# Patient Record
Sex: FEMALE | Race: WHITE | NOT HISPANIC OR LATINO | Employment: FULL TIME | URBAN - METROPOLITAN AREA
[De-identification: names, ages, dates, MRNs, and addresses within clinical notes are randomized per-mention and may not be internally consistent; named-entity substitution may affect disease eponyms.]

---

## 2020-12-03 ENCOUNTER — TRANSFERRED RECORDS (OUTPATIENT)
Dept: HEALTH INFORMATION MANAGEMENT | Facility: CLINIC | Age: 25
End: 2020-12-03

## 2020-12-03 LAB — PAP-ABSTRACT: NORMAL

## 2022-03-24 ENCOUNTER — TRANSFERRED RECORDS (OUTPATIENT)
Dept: HEALTH INFORMATION MANAGEMENT | Facility: CLINIC | Age: 27
End: 2022-03-24

## 2023-10-25 ENCOUNTER — OFFICE VISIT (OUTPATIENT)
Dept: URGENT CARE | Facility: URGENT CARE | Age: 28
End: 2023-10-25
Payer: COMMERCIAL

## 2023-10-25 VITALS
WEIGHT: 218.6 LBS | DIASTOLIC BLOOD PRESSURE: 104 MMHG | SYSTOLIC BLOOD PRESSURE: 171 MMHG | OXYGEN SATURATION: 96 % | TEMPERATURE: 98.7 F | RESPIRATION RATE: 12 BRPM | HEART RATE: 108 BPM

## 2023-10-25 DIAGNOSIS — H69.92 DYSFUNCTION OF LEFT EUSTACHIAN TUBE: Primary | ICD-10-CM

## 2023-10-25 PROCEDURE — 99203 OFFICE O/P NEW LOW 30 MIN: CPT | Performed by: PHYSICIAN ASSISTANT

## 2023-10-25 NOTE — PROGRESS NOTES
Chief Complaint   Patient presents with    Urgent Care    Otalgia     Left ear pain for couple days, last week had uti and on antibiotic but don't know if have ear infection at the time, but it feels plugged (finished antibiotic for uti on Sunday)       ASSESSMENT/PLAN:  Cortes was seen today for urgent care and otalgia.    Diagnoses and all orders for this visit:    Dysfunction of left eustachian tube    Symptomatic cares and expected length of symptoms discussed at length and outlined in AVS  Return precautions also discussed    Flonase/fluticasone nasal spray 2 sprays in each nostril once a day for 1 - 4 weeks.  This may take several days to become effective.   Consider saline nasal rinses  Ibuprofen 400 mg to 600 mg 4 times a day as needed for pain relief and anti-inflammatory  Otovent can be bought on [employer]    Dimitry Schmidt PA-C      SUBJECTIVE:  Cortes is a 28 year old female who presents to urgent care with left ear pain that started yesterday.  She has had a cold like illness for a week, was seen in the ER last week and treated for UTI.  Did have a CT scan of her head done at that time due to dizziness which was normal.  Overall the symptoms have improved.  No fevers or chills..    ROS: Pertinent ROS neg other than the symptoms noted above in the HPI.     OBJECTIVE:  BP (!) 171/104 (BP Location: Left arm, Patient Position: Sitting, Cuff Size: Adult Large)   Pulse 108   Temp 98.7  F (37.1  C) (Tympanic)   Resp 12   Wt 99.2 kg (218 lb 9.6 oz)   LMP 10/14/2023   SpO2 96%    GENERAL: healthy, alert and no distress  EYES: Eyes grossly normal to inspection, PERRL and conjunctivae and sclerae normal  HENT: ear canals and TM's normal, nose and mouth without ulcers or lesions, no oropharynx erythema, nasal nasal mucosa erythema  Neuro: Cranial nerves 2 through 12 grossly intact, normal gait    DIAGNOSTICS    No results found for any visits on 10/25/23.     No current outpatient medications on file.     No  current facility-administered medications for this visit.      There is no problem list on file for this patient.     No past medical history on file.  No past surgical history on file.  History reviewed. No pertinent family history.  Social History     Tobacco Use    Smoking status: Never    Smokeless tobacco: Never   Substance Use Topics    Alcohol use: Yes              The plan of care was discussed with the patient. They understand and agree with the course of treatment prescribed. A printed summary was given including instructions and medications.  The use of Dragon/QVPN dictation services may have been used to construct the content in this note; any grammatical or spelling errors are non-intentional. Please contact the author of this note directly if you are in need of any clarification.

## 2023-10-25 NOTE — PATIENT INSTRUCTIONS
Flonase/fluticasone nasal spray 2 sprays in each nostril once a day for 1 - 4 weeks.  This may take several days to become effective.   Consider saline nasal rinses  Ibuprofen 400 mg to 600 mg 4 times a day as needed for pain relief and anti-inflammatory  Otovent can be bought on [employer]

## 2023-11-21 ENCOUNTER — OFFICE VISIT (OUTPATIENT)
Dept: FAMILY MEDICINE | Facility: CLINIC | Age: 28
End: 2023-11-21
Payer: COMMERCIAL

## 2023-11-21 VITALS
WEIGHT: 213 LBS | TEMPERATURE: 97.5 F | DIASTOLIC BLOOD PRESSURE: 100 MMHG | SYSTOLIC BLOOD PRESSURE: 158 MMHG | OXYGEN SATURATION: 96 % | RESPIRATION RATE: 16 BRPM | BODY MASS INDEX: 33.43 KG/M2 | HEIGHT: 67 IN | HEART RATE: 105 BPM

## 2023-11-21 DIAGNOSIS — I10 ESSENTIAL HYPERTENSION: ICD-10-CM

## 2023-11-21 DIAGNOSIS — R79.89 ABNORMAL TSH: ICD-10-CM

## 2023-11-21 DIAGNOSIS — E66.811 CLASS 1 OBESITY DUE TO EXCESS CALORIES WITH SERIOUS COMORBIDITY AND BODY MASS INDEX (BMI) OF 33.0 TO 33.9 IN ADULT: ICD-10-CM

## 2023-11-21 DIAGNOSIS — R53.83 FATIGUE, UNSPECIFIED TYPE: ICD-10-CM

## 2023-11-21 DIAGNOSIS — E66.09 CLASS 1 OBESITY DUE TO EXCESS CALORIES WITH SERIOUS COMORBIDITY AND BODY MASS INDEX (BMI) OF 33.0 TO 33.9 IN ADULT: ICD-10-CM

## 2023-11-21 DIAGNOSIS — Z11.4 SCREENING FOR HIV (HUMAN IMMUNODEFICIENCY VIRUS): ICD-10-CM

## 2023-11-21 DIAGNOSIS — F41.9 ANXIETY: Primary | ICD-10-CM

## 2023-11-21 DIAGNOSIS — Z11.59 NEED FOR HEPATITIS C SCREENING TEST: ICD-10-CM

## 2023-11-21 LAB
ALBUMIN SERPL BCG-MCNC: 4.7 G/DL (ref 3.5–5.2)
ALP SERPL-CCNC: 76 U/L (ref 40–150)
ALT SERPL W P-5'-P-CCNC: 38 U/L (ref 0–50)
ANION GAP SERPL CALCULATED.3IONS-SCNC: 11 MMOL/L (ref 7–15)
AST SERPL W P-5'-P-CCNC: 25 U/L (ref 0–45)
BILIRUB SERPL-MCNC: 0.4 MG/DL
BUN SERPL-MCNC: 9.2 MG/DL (ref 6–20)
CALCIUM SERPL-MCNC: 9.7 MG/DL (ref 8.6–10)
CHLORIDE SERPL-SCNC: 106 MMOL/L (ref 98–107)
CREAT SERPL-MCNC: 0.66 MG/DL (ref 0.51–0.95)
DEPRECATED HCO3 PLAS-SCNC: 24 MMOL/L (ref 22–29)
EGFRCR SERPLBLD CKD-EPI 2021: >90 ML/MIN/1.73M2
ERYTHROCYTE [DISTWIDTH] IN BLOOD BY AUTOMATED COUNT: 11.8 % (ref 10–15)
FERRITIN SERPL-MCNC: 62 NG/ML (ref 6–175)
GLUCOSE SERPL-MCNC: 108 MG/DL (ref 70–99)
HBA1C MFR BLD: 5.2 % (ref 0–5.6)
HCT VFR BLD AUTO: 44.6 % (ref 35–47)
HGB BLD-MCNC: 14.6 G/DL (ref 11.7–15.7)
HIV 1+2 AB+HIV1 P24 AG SERPL QL IA: NONREACTIVE
IRON BINDING CAPACITY (ROCHE): 316 UG/DL (ref 240–430)
IRON SATN MFR SERPL: 26 % (ref 15–46)
IRON SERPL-MCNC: 81 UG/DL (ref 37–145)
MCH RBC QN AUTO: 28.6 PG (ref 26.5–33)
MCHC RBC AUTO-ENTMCNC: 32.7 G/DL (ref 31.5–36.5)
MCV RBC AUTO: 88 FL (ref 78–100)
PLATELET # BLD AUTO: 234 10E3/UL (ref 150–450)
POTASSIUM SERPL-SCNC: 4.2 MMOL/L (ref 3.4–5.3)
PROT SERPL-MCNC: 7.3 G/DL (ref 6.4–8.3)
RBC # BLD AUTO: 5.1 10E6/UL (ref 3.8–5.2)
SODIUM SERPL-SCNC: 141 MMOL/L (ref 135–145)
T3FREE SERPL-MCNC: 3.4 PG/ML (ref 2–4.4)
T4 FREE SERPL-MCNC: 1.26 NG/DL (ref 0.9–1.7)
TSH SERPL DL<=0.005 MIU/L-ACNC: 3.93 UIU/ML (ref 0.3–4.2)
VIT D+METAB SERPL-MCNC: 17 NG/ML (ref 20–50)
WBC # BLD AUTO: 6.5 10E3/UL (ref 4–11)

## 2023-11-21 PROCEDURE — 83036 HEMOGLOBIN GLYCOSYLATED A1C: CPT | Performed by: FAMILY MEDICINE

## 2023-11-21 PROCEDURE — 99215 OFFICE O/P EST HI 40 MIN: CPT | Performed by: FAMILY MEDICINE

## 2023-11-21 PROCEDURE — 36415 COLL VENOUS BLD VENIPUNCTURE: CPT | Performed by: FAMILY MEDICINE

## 2023-11-21 PROCEDURE — 84439 ASSAY OF FREE THYROXINE: CPT | Performed by: FAMILY MEDICINE

## 2023-11-21 PROCEDURE — 86803 HEPATITIS C AB TEST: CPT | Performed by: FAMILY MEDICINE

## 2023-11-21 PROCEDURE — 96127 BRIEF EMOTIONAL/BEHAV ASSMT: CPT | Performed by: FAMILY MEDICINE

## 2023-11-21 PROCEDURE — 87389 HIV-1 AG W/HIV-1&-2 AB AG IA: CPT | Performed by: FAMILY MEDICINE

## 2023-11-21 PROCEDURE — 80053 COMPREHEN METABOLIC PANEL: CPT | Performed by: FAMILY MEDICINE

## 2023-11-21 PROCEDURE — 83540 ASSAY OF IRON: CPT | Performed by: FAMILY MEDICINE

## 2023-11-21 PROCEDURE — 86376 MICROSOMAL ANTIBODY EACH: CPT | Performed by: FAMILY MEDICINE

## 2023-11-21 PROCEDURE — 84443 ASSAY THYROID STIM HORMONE: CPT | Performed by: FAMILY MEDICINE

## 2023-11-21 PROCEDURE — 85027 COMPLETE CBC AUTOMATED: CPT | Performed by: FAMILY MEDICINE

## 2023-11-21 PROCEDURE — 83550 IRON BINDING TEST: CPT | Performed by: FAMILY MEDICINE

## 2023-11-21 PROCEDURE — 82728 ASSAY OF FERRITIN: CPT | Performed by: FAMILY MEDICINE

## 2023-11-21 PROCEDURE — 84481 FREE ASSAY (FT-3): CPT | Performed by: FAMILY MEDICINE

## 2023-11-21 PROCEDURE — 82306 VITAMIN D 25 HYDROXY: CPT | Performed by: FAMILY MEDICINE

## 2023-11-21 RX ORDER — PROPRANOLOL HYDROCHLORIDE 10 MG/1
10-20 TABLET ORAL 3 TIMES DAILY PRN
Qty: 60 TABLET | Refills: 1 | Status: SHIPPED | OUTPATIENT
Start: 2023-11-21

## 2023-11-21 RX ORDER — LOSARTAN POTASSIUM 50 MG/1
50 TABLET ORAL DAILY
Qty: 30 TABLET | Refills: 1 | Status: SHIPPED | OUTPATIENT
Start: 2023-11-21 | End: 2023-12-20

## 2023-11-21 ASSESSMENT — ANXIETY QUESTIONNAIRES
4. TROUBLE RELAXING: NOT AT ALL
1. FEELING NERVOUS, ANXIOUS, OR ON EDGE: MORE THAN HALF THE DAYS
7. FEELING AFRAID AS IF SOMETHING AWFUL MIGHT HAPPEN: MORE THAN HALF THE DAYS
5. BEING SO RESTLESS THAT IT IS HARD TO SIT STILL: NOT AT ALL
GAD7 TOTAL SCORE: 8
6. BECOMING EASILY ANNOYED OR IRRITABLE: NOT AT ALL
GAD7 TOTAL SCORE: 8
3. WORRYING TOO MUCH ABOUT DIFFERENT THINGS: MORE THAN HALF THE DAYS
IF YOU CHECKED OFF ANY PROBLEMS ON THIS QUESTIONNAIRE, HOW DIFFICULT HAVE THESE PROBLEMS MADE IT FOR YOU TO DO YOUR WORK, TAKE CARE OF THINGS AT HOME, OR GET ALONG WITH OTHER PEOPLE: SOMEWHAT DIFFICULT
2. NOT BEING ABLE TO STOP OR CONTROL WORRYING: MORE THAN HALF THE DAYS

## 2023-11-21 ASSESSMENT — PAIN SCALES - GENERAL: PAINLEVEL: NO PAIN (0)

## 2023-11-21 NOTE — PROGRESS NOTES
Assessment & Plan     Anxiety  She appears to be quite anxious which is likely contributing to many of her symptoms, including the elevated blood pressure readings.  She is hesitant about starting a daily medication to help with anxiety before seeing a counselor first.  I put in a referral so that she can start therapy as soon as possible.  I also sent in a prescription for propranolol that she may use as needed for acute anxiety symptoms and panic episodes.  I explained that this medication can help bring the blood pressure down as well.  She is planning to start monitoring her blood pressure closely at home.  - propranolol (INDERAL) 10 MG tablet; Take 1-2 tablets (10-20 mg) by mouth 3 times daily as needed (Anxiety)  - Adult Mental Health  Referral; Future    Essential hypertension  I expressed my concern that her blood pressure is running so high.  It was 197/107 when she was in the emergency department last month.  Although anxiety may be contributing to some of this, I suspect that she has underlying hypertension as well.  This runs in her family.  I recommended starting losartan daily and eating a healthy low-sodium diet.  We are going to be checking a CMP today.  She is going to start monitoring her blood pressure closely at home and schedule follow-up visit with me in 1 month or sooner if needed.  - Comprehensive metabolic panel (BMP + Alb, Alk Phos, ALT, AST, Total. Bili, TP); Future  - losartan (COZAAR) 50 MG tablet; Take 1 tablet (50 mg) by mouth daily  - Comprehensive metabolic panel (BMP + Alb, Alk Phos, ALT, AST, Total. Bili, TP)    Abnormal TSH  She had a mildly elevated TSH when she was in the emergency department last month.  I recommended checking labs as listed below to further evaluate this issue.  - Thyroid peroxidase antibody; Future  - T3, Free; Future  - T4, free; Future  - TSH; Future  - Thyroid peroxidase antibody  - T3, Free  - T4, free  - TSH    Fatigue, unspecified type  I  "recommended checking labs as listed below.  I explained that the underlying cause of her chronic fatigue symptoms is likely multifactorial.  She is going to work on stress reducing activities, getting adequate rest, eating a healthy diet and getting regular exercise while we are waiting for the lab results and we will see how things are going at her follow-up appointment in 1 month  - CBC with platelets; Future  - Vitamin D Deficiency; Future  - Hemoglobin A1c; Future  - Comprehensive metabolic panel (BMP + Alb, Alk Phos, ALT, AST, Total. Bili, TP); Future  - Ferritin; Future  - Iron and iron binding capacity; Future  - CBC with platelets  - Vitamin D Deficiency  - Hemoglobin A1c  - Comprehensive metabolic panel (BMP + Alb, Alk Phos, ALT, AST, Total. Bili, TP)  - Ferritin  - Iron and iron binding capacity    Class 1 obesity due to excess calories with serious comorbidity and body mass index (BMI) of 33.0 to 33.9 in adult  She is going to work on lifestyle modifications which should help with weight loss.    Screening for HIV (human immunodeficiency virus)  - HIV Antigen Antibody Combo; Future  - HIV Antigen Antibody Combo    Need for hepatitis C screening test  - Hepatitis C Screen Reflex to HCV RNA Quant and Genotype; Future  - Hepatitis C Screen Reflex to HCV RNA Quant and Genotype      49 minutes spent by me on the date of the encounter doing chart review, review of outside records, review of test results, interpretation of tests, patient visit, and documentation        BMI:   Estimated body mass index is 33.36 kg/m  as calculated from the following:    Height as of this encounter: 1.702 m (5' 7\").    Weight as of this encounter: 96.6 kg (213 lb).   Weight management plan: Discussed healthy diet and exercise guidelines        Haseeb Schuster, DO  [facility]    Aranza Kolb is a 28 year old, presenting for the following health issues:  Thyroid Problem        11/21/2023     9:41 " AM   Additional Questions   Roomed by Justin MARTIN       History of Present Illness       Mental Health Follow-up:  Patient presents to follow-up on Anxiety.    Patient's anxiety since last visit has been:  No change  The patient is having other symptoms associated with anxiety.  Any significant life events: other  Patient is feeling anxious or having panic attacks.  Patient has no concerns about alcohol or drug use.    Reason for visit:  Pre clinical hypothyrodism  Symptom onset:  More than a month    She eats 4 or more servings of fruits and vegetables daily.She consumes 1 sweetened beverage(s) daily.She exercises with enough effort to increase her heart rate 30 to 60 minutes per day.  She exercises with enough effort to increase her heart rate 5 days per week.   She is taking medications regularly.     Heightened anxiety  Wooziness  Lethargy  GI issues    She describes struggling with worsening anxiety symptoms over the past few months.  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  She is concerned because she has been experiencing some dizziness, lightheadedness and chronically feeling fatigued and rundown during this time.  She reports having a history of anxiety, but has never taken medication for this and symptoms have never been this severe.  She thinks that she may be experiencing some panic episodes lately as well.  She has also noticed some GI symptoms since the summer but seem to be a little worse lately.    On 10/17/2023 she was seen at the emergency department at [facility] for dizziness, racing heartbeat, feeling lightheaded blurry vision in the left eye and intermittent numbness/weakness in the left arm and leg.  Her blood pressure was high at 197/107.  Her pulse was 115.  They checked a CT scan of the brain which was unremarkable.  They checked several labs which showed a TSH that was mildly elevated at 6.30.  The CMP and CBC results were  "normal.  Her urinalysis was concerning for a UTI and she was started on nitrofurantoin.  She is not complaining of abdominal pain or dysuria at this time.  She reports that she frequently feels thirsty, but she feels like she drinks a lot of water to help with anxiety symptoms.  She has also noticed some OCD tendencies as well.  This runs in her family.  She has a sister with anxiety, depression and OCD.  She is not sure what treatments she is on for these things.              Review of Systems   Constitutional, HEENT, cardiovascular, pulmonary, GI, , musculoskeletal, neuro, skin, endocrine and psych systems are negative, except as otherwise noted.      Objective    BP (!) 158/100   Pulse 105   Temp 97.5  F (36.4  C) (Temporal)   Resp 16   Ht 1.702 m (5' 7\")   Wt 96.6 kg (213 lb)   LMP 11/08/2023 (Exact Date)   SpO2 96%   BMI 33.36 kg/m    Body mass index is 33.36 kg/m .  Physical Exam   GENERAL: healthy, alert and no distress  EYES: Eyes grossly normal to inspection, PERRL and conjunctivae and sclerae normal  HENT: ear canals and TM's normal, nose and mouth without ulcers or lesions  NECK: no adenopathy, no asymmetry, masses, or scars and thyroid normal to palpation  RESP: lungs clear to auscultation - no rales, rhonchi or wheezes  CV: regular rate and rhythm, normal S1 S2, no S3 or S4, no murmur, click or rub, no peripheral edema and peripheral pulses strong  ABDOMEN: soft, nontender, no hepatosplenomegaly, no masses and bowel sounds normal  MS: no gross musculoskeletal defects noted, no edema  SKIN: no suspicious lesions or rashes  NEURO: Normal strength and tone, mentation intact and speech normal  PSYCH: She appears significantly anxious and she is tearful at times during the appointment.  Answers questions appropriately.  Insight and judgment intact.                    "

## 2023-11-22 LAB
HCV AB SERPL QL IA: NONREACTIVE
THYROPEROXIDASE AB SERPL-ACNC: <10 IU/ML

## 2023-12-20 ENCOUNTER — OFFICE VISIT (OUTPATIENT)
Dept: FAMILY MEDICINE | Facility: CLINIC | Age: 28
End: 2023-12-20
Payer: COMMERCIAL

## 2023-12-20 VITALS
WEIGHT: 213 LBS | BODY MASS INDEX: 34.23 KG/M2 | SYSTOLIC BLOOD PRESSURE: 144 MMHG | HEART RATE: 92 BPM | HEIGHT: 66 IN | RESPIRATION RATE: 16 BRPM | DIASTOLIC BLOOD PRESSURE: 90 MMHG | TEMPERATURE: 98.4 F | OXYGEN SATURATION: 98 %

## 2023-12-20 DIAGNOSIS — F41.9 ANXIETY: ICD-10-CM

## 2023-12-20 DIAGNOSIS — I10 ESSENTIAL HYPERTENSION: Primary | ICD-10-CM

## 2023-12-20 PROCEDURE — 90471 IMMUNIZATION ADMIN: CPT | Performed by: FAMILY MEDICINE

## 2023-12-20 PROCEDURE — 99214 OFFICE O/P EST MOD 30 MIN: CPT | Mod: 25 | Performed by: FAMILY MEDICINE

## 2023-12-20 PROCEDURE — 80048 BASIC METABOLIC PNL TOTAL CA: CPT | Performed by: FAMILY MEDICINE

## 2023-12-20 PROCEDURE — 36415 COLL VENOUS BLD VENIPUNCTURE: CPT | Performed by: FAMILY MEDICINE

## 2023-12-20 PROCEDURE — 90686 IIV4 VACC NO PRSV 0.5 ML IM: CPT | Performed by: FAMILY MEDICINE

## 2023-12-20 RX ORDER — LOSARTAN POTASSIUM 100 MG/1
100 TABLET ORAL DAILY
Qty: 90 TABLET | Refills: 3 | Status: SHIPPED | OUTPATIENT
Start: 2023-12-20 | End: 2024-01-03

## 2023-12-20 ASSESSMENT — PAIN SCALES - GENERAL: PAINLEVEL: NO PAIN (0)

## 2023-12-20 NOTE — PROGRESS NOTES
Assessment & Plan     Essential hypertension  Her blood pressure has come down a lot since starting losartan, but it is still above goal.  I recommended increasing the losartan dose to 100 mg daily.  She will continue to work on lifestyle modifications as well and return to the clinic in about 1-2 months for follow-up visit.  - Basic metabolic panel  (Ca, Cl, CO2, Creat, Gluc, K, Na, BUN); Future  - losartan (COZAAR) 100 MG tablet; Take 1 tablet (100 mg) by mouth daily  - Basic metabolic panel  (Ca, Cl, CO2, Creat, Gluc, K, Na, BUN)    Anxiety  Anxiety symptoms have improved since starting the blood pressure medication.  She also has propranolol available if needed and she is starting to see a therapist.                 [facility]uster, [facility] is [facility] 28 year [facility], presenting for the following health issues:  Recheck Medication        12/20/2023    10:58 AM   Additional Questions   Roomed by jenna zimmerman   Accompanied by merary       History of Present Illness       Hypertension: She presents for follow up of hypertension.  She does not check blood pressure  regularly outside of the clinic. Outside blood pressures have been over 140/90. She does not follow a low salt diet.     She eats 4 or more servings of fruits and vegetables daily.She consumes 0 sweetened beverage(s) daily.She exercises with enough effort to increase her heart rate 30 to 60 minutes per day.  She exercises with enough effort to increase her heart rate 5 days per week.   She is taking medications regularly.               She presents to the clinic today for follow-up visit regarding hypertension and anxiety.  She has been taking losartan 50 mg daily and is tolerating it well.  She has not checked her blood pressure at home, but she reports that anxiety symptoms seem to be improved quite a bit since starting this.  She is starting to see a therapist as well.  She has not tried the propranolol yet.  " On 11/21/2023 she had several labs checked which were all normal with the exception that the vitamin D level was a little low.  The normal lab results included iron studies, CMP, A1c, CBC and thyroid labs.    Review of Systems   Constitutional, HEENT, cardiovascular, pulmonary, GI, , musculoskeletal, neuro, skin, endocrine and psych systems are negative, except as otherwise noted.      Objective    BP (!) 144/90   Pulse 92   Temp 98.4  F (36.9  C) (Temporal)   Resp 16   Ht 1.683 m (5' 6.25\")   Wt 96.6 kg (213 lb)   LMP 12/12/2023 (Exact Date)   SpO2 98%   BMI 34.12 kg/m    Body mass index is 34.12 kg/m .  Physical Exam   GENERAL: healthy, alert and no distress  EYES: Eyes grossly normal to inspection, PERRL and conjunctivae and sclerae normal  NECK: no adenopathy, no asymmetry, masses, or scars and thyroid normal to palpation  RESP: lungs clear to auscultation - no rales, rhonchi or wheezes  CV: regular rate and rhythm, normal S1 S2, no S3 or S4, no murmur, click or rub,   MS: no gross musculoskeletal defects noted, no edema  SKIN: no suspicious lesions or rashes  NEURO: Normal strength and tone, mentation intact and speech normal  PSYCH: mentation appears normal, she appears moderately anxious.  She answers questions appropriately.                      "

## 2023-12-21 LAB
ANION GAP SERPL CALCULATED.3IONS-SCNC: 10 MMOL/L (ref 7–15)
BUN SERPL-MCNC: 12.9 MG/DL (ref 6–20)
CALCIUM SERPL-MCNC: 9.5 MG/DL (ref 8.6–10)
CHLORIDE SERPL-SCNC: 107 MMOL/L (ref 98–107)
CREAT SERPL-MCNC: 0.61 MG/DL (ref 0.51–0.95)
DEPRECATED HCO3 PLAS-SCNC: 23 MMOL/L (ref 22–29)
EGFRCR SERPLBLD CKD-EPI 2021: >90 ML/MIN/1.73M2
GLUCOSE SERPL-MCNC: 118 MG/DL (ref 70–99)
POTASSIUM SERPL-SCNC: 4.1 MMOL/L (ref 3.4–5.3)
SODIUM SERPL-SCNC: 140 MMOL/L (ref 135–145)

## 2023-12-31 ENCOUNTER — HEALTH MAINTENANCE LETTER (OUTPATIENT)
Age: 28
End: 2023-12-31

## 2024-01-03 ENCOUNTER — TELEPHONE (OUTPATIENT)
Dept: FAMILY MEDICINE | Facility: CLINIC | Age: 29
End: 2024-01-03

## 2024-01-03 DIAGNOSIS — I10 ESSENTIAL HYPERTENSION: Primary | ICD-10-CM

## 2024-01-03 RX ORDER — LOSARTAN POTASSIUM 50 MG/1
50 TABLET ORAL DAILY
Qty: 90 TABLET | Refills: 1 | Status: SHIPPED | OUTPATIENT
Start: 2024-01-03 | End: 2024-04-15

## 2024-01-03 NOTE — TELEPHONE ENCOUNTER
I recommend that she decrease the losartan dose back to 50 mg daily.  She should keep an eye on her blood pressure and let us know if it is still running high before her next appointment.  Thank you, DE

## 2024-01-03 NOTE — TELEPHONE ENCOUNTER
"Patient calling  Had OV 12/20/23  Dose of losartan increased from 50mg to 100mg  Has been experiencing light headedness since increased dose  Denies other symptoms  Just states \"I haven't been feeling well with this dose\"  Has not taken home BP, encouraged to take today if possible  Requesting to decrease dose back to 50mg, or to try 75mg  Please advise  Thanks,  Trena CHAVES RN    "

## 2024-01-03 NOTE — TELEPHONE ENCOUNTER
Chaperone present for exam.   Physical Exam   Genitourinary:    Vagina normal.   There is no tenderness or lesion on the right labia. There is no tenderness or lesion on the left labia. Cervix exhibits friability.    Genitourinary Comments: Excoriation around rectum    Bimanual palpation demonstrated no abnormalities, patient denies any pain or tenderness.   DE,    Patient informed.  Verbalizes understanding of message below from DE below.     Asking for Losartan 50 mg Rx - states 100 mg tabs are not scored, difficult to cut in half.    Thank you,  Rhona Melendez RN

## 2024-04-15 ENCOUNTER — OFFICE VISIT (OUTPATIENT)
Dept: FAMILY MEDICINE | Facility: CLINIC | Age: 29
End: 2024-04-15
Payer: COMMERCIAL

## 2024-04-15 VITALS
SYSTOLIC BLOOD PRESSURE: 138 MMHG | TEMPERATURE: 97.7 F | HEIGHT: 67 IN | WEIGHT: 222 LBS | RESPIRATION RATE: 16 BRPM | HEART RATE: 108 BPM | OXYGEN SATURATION: 100 % | DIASTOLIC BLOOD PRESSURE: 86 MMHG | BODY MASS INDEX: 34.84 KG/M2

## 2024-04-15 DIAGNOSIS — F41.9 ANXIETY: ICD-10-CM

## 2024-04-15 DIAGNOSIS — I10 ESSENTIAL HYPERTENSION: Primary | ICD-10-CM

## 2024-04-15 PROCEDURE — 99214 OFFICE O/P EST MOD 30 MIN: CPT | Performed by: FAMILY MEDICINE

## 2024-04-15 RX ORDER — LOSARTAN POTASSIUM 50 MG/1
50 TABLET ORAL DAILY
Qty: 90 TABLET | Refills: 1 | Status: SHIPPED | OUTPATIENT
Start: 2024-04-15

## 2024-04-15 ASSESSMENT — ANXIETY QUESTIONNAIRES
7. FEELING AFRAID AS IF SOMETHING AWFUL MIGHT HAPPEN: NOT AT ALL
8. IF YOU CHECKED OFF ANY PROBLEMS, HOW DIFFICULT HAVE THESE MADE IT FOR YOU TO DO YOUR WORK, TAKE CARE OF THINGS AT HOME, OR GET ALONG WITH OTHER PEOPLE?: NOT DIFFICULT AT ALL
GAD7 TOTAL SCORE: 3
GAD7 TOTAL SCORE: 3
IF YOU CHECKED OFF ANY PROBLEMS ON THIS QUESTIONNAIRE, HOW DIFFICULT HAVE THESE PROBLEMS MADE IT FOR YOU TO DO YOUR WORK, TAKE CARE OF THINGS AT HOME, OR GET ALONG WITH OTHER PEOPLE: NOT DIFFICULT AT ALL
5. BEING SO RESTLESS THAT IT IS HARD TO SIT STILL: NOT AT ALL
4. TROUBLE RELAXING: NOT AT ALL
7. FEELING AFRAID AS IF SOMETHING AWFUL MIGHT HAPPEN: NOT AT ALL
3. WORRYING TOO MUCH ABOUT DIFFERENT THINGS: SEVERAL DAYS
6. BECOMING EASILY ANNOYED OR IRRITABLE: NOT AT ALL
GAD7 TOTAL SCORE: 3
2. NOT BEING ABLE TO STOP OR CONTROL WORRYING: SEVERAL DAYS
1. FEELING NERVOUS, ANXIOUS, OR ON EDGE: SEVERAL DAYS

## 2024-04-15 ASSESSMENT — PATIENT HEALTH QUESTIONNAIRE - PHQ9
SUM OF ALL RESPONSES TO PHQ QUESTIONS 1-9: 0
SUM OF ALL RESPONSES TO PHQ QUESTIONS 1-9: 0
10. IF YOU CHECKED OFF ANY PROBLEMS, HOW DIFFICULT HAVE THESE PROBLEMS MADE IT FOR YOU TO DO YOUR WORK, TAKE CARE OF THINGS AT HOME, OR GET ALONG WITH OTHER PEOPLE: NOT DIFFICULT AT ALL

## 2024-04-15 ASSESSMENT — PAIN SCALES - GENERAL: PAINLEVEL: NO PAIN (0)

## 2024-04-15 NOTE — PROGRESS NOTES
Assessment & Plan   Essential hypertension  Patient had elevated blood pressures in clinic and again on recheck. Did not tolerate increased dose (100mg) of losartan due to lightheadedness and fatigue. While patient has a family history of hypertension, her high blood pressures are also likely fueled by anxiety. Discussed it may be helpful to monitor blood pressures daily at home to get a baseline, as well as to get used to taking blood pressure. Plan to continue current dose of losartan. Patient was in agreement with this plan.   - Continue losartan (COZAAR) 50 MG tablet; Take 1 tablet (50 mg) by mouth daily  - Follow-up in 6 months at annual physical or sooner if new concerns arise    Anxiety  Patient reports anxiety has been well-controlled at home. Currently seeing a therapist that is helpful. Reports feeling very anxious for her visit today and is reflected by her initial pulse and blood pressure. Discussed with patient it may be beneficial to use a daily anti-anxiety medication. She politely declined, as she states she is doing much better than previously from an anxiety standpoint. Discussed propranolol may be helpful for situational anxiety.   - Continue propranolol 10mg tablets PRN for anxiety   - Follow-up in 6 months at annual physical or sooner if new concerns arise          MEDICATIONS:  Continue current medications without change  FUTURE APPOINTMENTS:       - Follow-up for annual visit in 6 months or as needed if new concerns arise.     Arazna Kolb is a 28 year old, presenting for the following health issues:  Hypertension        4/15/2024     2:09 PM   Additional Questions   Roomed by Sol العراقي   Accompanied by n/a     History of Present Illness       Hypertension: She presents for follow up of hypertension.  She does not check blood pressure  regularly outside of the clinic. Outside blood pressures have been over 140/90. She follows a low salt diet.     She eats 4 or more servings of fruits and  "vegetables daily.She consumes 0 sweetened beverage(s) daily.She exercises with enough effort to increase her heart rate 30 to 60 minutes per day.  She exercises with enough effort to increase her heart rate 5 days per week.   She is taking medications regularly.     Cortes presents to clinic for follow-up regarding her anxiety and hypertension. She is not taking 100mg losartan - felt \"off\" on the medication. Felt dizzy, fatigued and had heart palpations. Currently taking 50mg tablet and feels well on this dosage. She is not monitoring blood pressures at home, as it makes her anxious. Denies feeling dizzy, lightheaded or faint. Denies vision changes. No recent headaches. No upper belly pain. Exercising well - walking multiple times per day and plays pickle ball regularly.     Kamla shares that she has a family history of hypertension, in her Dad and likely her mom. Reports she has struggled with anxiety and high blood pressure since her childhood. Had a work-up when she was 14 that was unremarkable and 24-hour blood pressure monitor was normal.     Cortes reports not currently taking the propranolol. Feels like she's not needing it at home. Seeing both a talk and yoga therapist and feels that is helpful with managing anxiety symptoms.     Review of Systems  Constitutional, HEENT, cardiovascular, pulmonary, gi and gu systems are negative, except as otherwise noted.      Objective    /86   Pulse 108   Temp 97.7  F (36.5  C) (Temporal)   Resp 16   Ht 1.69 m (5' 6.54\")   Wt 100.7 kg (222 lb)   LMP 01/17/2024 (Approximate)   SpO2 100%   BMI 35.26 kg/m    Body mass index is 35.26 kg/m .    Physical Exam   GENERAL: healthy and alert. In no distress. Appears anxious.   NECK: no adenopathy, no asymmetry, masses, or scars  RESP: lungs clear to auscultation - no rales, rhonchi or wheezes  CV: regular rhythm, but increased rate. Normal S1 S2, no S3 or S4, no murmur, click or rub, no peripheral edema  ABDOMEN: " soft, nontender, no hepatosplenomegaly, no masses  MS: no gross musculoskeletal defects noted, no edema  NEURO: mentation intact. Appears anxious.     Previous labs obtained in December 2023 were reviewed today.       Cheyenne Garcia, MS3     Signed Electronically by: Haseeb Schuster DO

## 2024-05-24 ENCOUNTER — TELEPHONE (OUTPATIENT)
Dept: FAMILY MEDICINE | Facility: CLINIC | Age: 29
End: 2024-05-24

## 2024-05-24 NOTE — TELEPHONE ENCOUNTER
Patient calling,    Patent has a cold and would like to take cough drops but was concerned with taking it in concurrence with losartan medication.  Patient informed that cough drops should be ok to take.    Roderick HORTON RN

## 2024-09-03 ENCOUNTER — OFFICE VISIT (OUTPATIENT)
Dept: URGENT CARE | Facility: URGENT CARE | Age: 29
End: 2024-09-03
Payer: COMMERCIAL

## 2024-09-03 ENCOUNTER — ANCILLARY PROCEDURE (OUTPATIENT)
Dept: GENERAL RADIOLOGY | Facility: CLINIC | Age: 29
End: 2024-09-03
Attending: PHYSICIAN ASSISTANT
Payer: COMMERCIAL

## 2024-09-03 VITALS
DIASTOLIC BLOOD PRESSURE: 96 MMHG | BODY MASS INDEX: 36.85 KG/M2 | OXYGEN SATURATION: 99 % | WEIGHT: 232 LBS | HEART RATE: 82 BPM | TEMPERATURE: 97.8 F | SYSTOLIC BLOOD PRESSURE: 158 MMHG

## 2024-09-03 DIAGNOSIS — M79.605 PAIN OF LEFT LOWER EXTREMITY: Primary | ICD-10-CM

## 2024-09-03 PROCEDURE — 73590 X-RAY EXAM OF LOWER LEG: CPT | Mod: TC | Performed by: RADIOLOGY

## 2024-09-03 PROCEDURE — 99204 OFFICE O/P NEW MOD 45 MIN: CPT | Performed by: PHYSICIAN ASSISTANT

## 2024-09-03 ASSESSMENT — ENCOUNTER SYMPTOMS: ARTHRALGIAS: 1

## 2024-09-03 NOTE — PROGRESS NOTES
SUBJECTIVE:   Cortes Ribeiro is a 29 year old female presenting with a chief complaint of   Chief Complaint   Patient presents with    Urgent Care     Left shin pain  x yesterday - believes it might be from playing pickle ball        She is an established patient of [facility]. Patient presents with left lower extremity pain since yesterday.  Was playing pickle ball and had a short period of time and continued to play.  Since then, intermittent pain that has been intense.  Pain last 20-30 seconds and gets it 4-7 times a day.  Wakes her up from sleep.  Denies pregnancy.  Has been playing more pickle ball recently.  No other sports.      Treatment:  ice.  Recently placed on blood pressure medication and concerned that tylenol/motrin may raise BP    Review of Systems   Musculoskeletal:  Positive for arthralgias.   All other systems reviewed and are negative.      No past medical history on file.  Family History   Problem Relation Age of Onset    Depression Mother     Anxiety Disorder Mother     Diabetes Father     Hypertension Father     Peripheral Vascular Disease Father     Lung Cancer Father     Depression Sister     Anxiety Disorder Sister     Obsessive Compulsive Disorder Sister      Current Outpatient Medications   Medication Sig Dispense Refill    tiZANidine (ZANAFLEX) 4 MG tablet Take 1 tablet (4 mg) by mouth 3 times daily. 30 tablet 0    losartan (COZAAR) 50 MG tablet Take 1 tablet (50 mg) by mouth daily 90 tablet 1    propranolol (INDERAL) 10 MG tablet Take 1-2 tablets (10-20 mg) by mouth 3 times daily as needed (Anxiety) 60 tablet 1     Social History     Tobacco Use    Smoking status: Never    Smokeless tobacco: Never   Substance Use Topics    Alcohol use: Yes       OBJECTIVE  BP (!) 158/96   Pulse 82   Temp 97.8  F (36.6  C) (Tympanic)   Wt 105.2 kg (232 lb)   SpO2 99%   BMI 36.85 kg/m      Physical Exam  Vitals reviewed.   Constitutional:       General: She is not in acute distress.     Appearance:  Normal appearance. She is obese. She is not ill-appearing or toxic-appearing.   Cardiovascular:      Rate and Rhythm: Normal rate.   Musculoskeletal:         General: Tenderness present.      Comments: Left lower leg:  patient points out mid shin to lower shin the area of pain.  Negative vipin.  Noted discoloration at that area.  Tenderness to palpation of the same - approximately 5 along the tibia.     Skin:     General: Skin is warm.      Findings: No bruising.   Neurological:      General: No focal deficit present.      Mental Status: She is alert.         Labs:  Results for orders placed or performed in visit on 09/03/24 (from the past 24 hour(s))   XR Tibia and Fibula Left 2 Views    Narrative    XR TIBIA AND FIBULA LEFT 2 VIEWS 9/3/2024 3:55 PM     HISTORY: Pain of left lower extremity  COMPARISON: None.       Impression    IMPRESSION: Negative tibia and fibula. No acute fracture.    CATHY PRIDE MD         SYSTEM ID:  [id number]       X-Ray was done, my findings are: Xrays reviewed by myself and independently interpreted.  Any significant discrepancies with official radiologic read, patient will be notified.        No stress fx.    ASSESSMENT:      ICD-10-CM    1. Pain of left lower extremity  M79.605 XR Tibia and Fibula Left 2 Views     tiZANidine (ZANAFLEX) 4 MG tablet           Medical Decision Making:    Differential Diagnosis:  Stress fracture, muscle spasm    Serious Comorbid Conditions:  Adult:   reviewed    PLAN:  Concerns about a stress fracture, so xray was performed.  Likely muscle spasm.  Rx for zanaflex.  May take tylenol prn.  Discussed small increase in BP with NSAIDs.    Discussed reasons to seek immediate medical attention.  Additionally if no improvement or worsening in one week, may follow up with PCP and/or UC.        Followup:    If not improving or if condition worsens, follow up with your Primary Care Provider, If not improving or if conditions worsens over the next 12-24 hours, go  to the Emergency Department    There are no Patient Instructions on file for this visit.

## 2024-10-21 ENCOUNTER — OFFICE VISIT (OUTPATIENT)
Dept: FAMILY MEDICINE | Facility: CLINIC | Age: 29
End: 2024-10-21
Payer: COMMERCIAL

## 2024-10-21 VITALS
BODY MASS INDEX: 36.79 KG/M2 | SYSTOLIC BLOOD PRESSURE: 150 MMHG | HEIGHT: 67 IN | TEMPERATURE: 97.3 F | WEIGHT: 234.4 LBS | DIASTOLIC BLOOD PRESSURE: 96 MMHG | OXYGEN SATURATION: 98 % | RESPIRATION RATE: 12 BRPM | HEART RATE: 110 BPM

## 2024-10-21 DIAGNOSIS — I10 ESSENTIAL HYPERTENSION: ICD-10-CM

## 2024-10-21 DIAGNOSIS — Z13.220 SCREENING CHOLESTEROL LEVEL: ICD-10-CM

## 2024-10-21 DIAGNOSIS — E55.9 VITAMIN D DEFICIENCY: ICD-10-CM

## 2024-10-21 DIAGNOSIS — Z00.00 ENCOUNTER FOR ADULT WELLNESS VISIT: Primary | ICD-10-CM

## 2024-10-21 DIAGNOSIS — Z12.4 CERVICAL CANCER SCREENING: ICD-10-CM

## 2024-10-21 DIAGNOSIS — Z13.1 SCREENING FOR DIABETES MELLITUS: ICD-10-CM

## 2024-10-21 DIAGNOSIS — E66.01 CLASS 2 SEVERE OBESITY DUE TO EXCESS CALORIES WITH SERIOUS COMORBIDITY AND BODY MASS INDEX (BMI) OF 37.0 TO 37.9 IN ADULT (H): ICD-10-CM

## 2024-10-21 DIAGNOSIS — F41.9 ANXIETY: ICD-10-CM

## 2024-10-21 DIAGNOSIS — E66.812 CLASS 2 SEVERE OBESITY DUE TO EXCESS CALORIES WITH SERIOUS COMORBIDITY AND BODY MASS INDEX (BMI) OF 37.0 TO 37.9 IN ADULT (H): ICD-10-CM

## 2024-10-21 DIAGNOSIS — N94.6 DYSMENORRHEA: ICD-10-CM

## 2024-10-21 LAB
EST. AVERAGE GLUCOSE BLD GHB EST-MCNC: 111 MG/DL
HBA1C MFR BLD: 5.5 % (ref 0–5.6)

## 2024-10-21 PROCEDURE — 99213 OFFICE O/P EST LOW 20 MIN: CPT | Mod: 25 | Performed by: FAMILY MEDICINE

## 2024-10-21 PROCEDURE — 99395 PREV VISIT EST AGE 18-39: CPT | Performed by: FAMILY MEDICINE

## 2024-10-21 PROCEDURE — 84443 ASSAY THYROID STIM HORMONE: CPT | Performed by: FAMILY MEDICINE

## 2024-10-21 PROCEDURE — 36415 COLL VENOUS BLD VENIPUNCTURE: CPT | Performed by: FAMILY MEDICINE

## 2024-10-21 PROCEDURE — 80053 COMPREHEN METABOLIC PANEL: CPT | Performed by: FAMILY MEDICINE

## 2024-10-21 PROCEDURE — 80061 LIPID PANEL: CPT | Performed by: FAMILY MEDICINE

## 2024-10-21 PROCEDURE — 84439 ASSAY OF FREE THYROXINE: CPT | Performed by: FAMILY MEDICINE

## 2024-10-21 PROCEDURE — 83036 HEMOGLOBIN GLYCOSYLATED A1C: CPT | Performed by: FAMILY MEDICINE

## 2024-10-21 PROCEDURE — 82306 VITAMIN D 25 HYDROXY: CPT | Performed by: FAMILY MEDICINE

## 2024-10-21 SDOH — HEALTH STABILITY: PHYSICAL HEALTH: ON AVERAGE, HOW MANY DAYS PER WEEK DO YOU ENGAGE IN MODERATE TO STRENUOUS EXERCISE (LIKE A BRISK WALK)?: 6 DAYS

## 2024-10-21 ASSESSMENT — PAIN SCALES - GENERAL: PAINLEVEL: NO PAIN (0)

## 2024-10-21 ASSESSMENT — SOCIAL DETERMINANTS OF HEALTH (SDOH): HOW OFTEN DO YOU GET TOGETHER WITH FRIENDS OR RELATIVES?: THREE TIMES A WEEK

## 2024-10-21 NOTE — PROGRESS NOTES
Preventive Care Visit  [facility]  Haseeb Schuster DO, Family Medicine  Oct 21, 2024      Assessment & Plan     Encounter for adult wellness visit  I encouraged her to keep working on getting regular exercise and eating healthy foods.  We are going to check nonfasting labs as listed below.    Essential hypertension  I expressed my concern that her blood pressure is still running very high.  There appears to be a very strong anxiety component contributing to this.  She tried a higher dose of losartan which she did not tolerate due to feeling dizzy and lightheaded.  Home blood pressure readings have generally been normal.  We decided to continue losartan at 50 mg for now and she will work on lifestyle modifications to help with weight loss and start monitoring her blood pressure again more regularly.  If it is still running high at home we will discuss adding a different blood pressure medication to her regimen.  - Comprehensive metabolic panel (BMP + Alb, Alk Phos, ALT, AST, Total. Bili, TP); Future  - Comprehensive metabolic panel (BMP + Alb, Alk Phos, ALT, AST, Total. Bili, TP)    Anxiety  Anxiety symptoms have been well-controlled at home, but continue to be very high during medical appointments.  This is likely contributing to her spikes in blood pressure.  I recommended she continue following with her therapist.  She is not interested in starting a medication for anxiety.    Class 2 severe obesity due to excess calories with serious comorbidity and body mass index (BMI) of 37.0 to 37.9 in adult (H)  We are checking labs as listed below and she will continue to work on lifestyle modifications help with weight loss.  - Hemoglobin A1c; Future  - TSH with free T4 reflex; Future  - Hemoglobin A1c  - TSH with free T4 reflex    Dysmenorrhea  She has concerns about irregular menses, facial hair growth on her chin, acne and difficulty with efforts to lose weight.  Her symptoms are consistent  "with PCOS.  She was given a referral to see an OB/GYN specialist to discuss this possibility further and to go over treatment options.  She has been on Kyleena in the past, but she is not sure if she would like another IUD placed.  - Ob/Gyn  Referral; Future    Cervical cancer screening  She reports already being scheduled to get a Pap smear through an OB/GYN provider.    Screening for diabetes mellitus  We are checking a CMP and hemoglobin A1c today.    Screening cholesterol level  - Lipid Profile (Chol, Trig, HDL, LDL calc); Future  - Lipid Profile (Chol, Trig, HDL, LDL calc)    Vitamin D deficiency  - Vitamin D Deficiency; Future  - Vitamin D Deficiency    Patient has been advised of split billing requirements and indicates understanding: Yes        BMI  Estimated body mass index is 37.27 kg/m  as calculated from the following:    Height as of this encounter: 1.689 m (5' 6.5\").    Weight as of this encounter: 106.3 kg (234 lb 6.4 oz).   Weight management plan: Discussed healthy diet and exercise guidelines    Counseling  Appropriate preventive services were addressed with this patient via screening, questionnaire, or discussion as appropriate for fall prevention, nutrition, physical activity, Tobacco-use cessation, social engagement, weight loss and cognition.  Checklist reviewing preventive services available has been given to the patient.  Reviewed patient's diet, addressing concerns and/or questions.           Aranza Kolb is a 29 year old, presenting for the following:  Physical        10/21/2024     2:22 PM   Additional Questions   Roomed by Justin SALVADOR    Cortes is a 29-year-old female with a medical history significant for anxiety, hypertension and obesity.  She is currently on losartan 50 mg daily which has been helpful in bringing down the blood pressure.  She tried the 100 mg dose which she did not tolerate.  She frequently gets spikes in her BP during medical appointments which " are extremely anxiety provoking for her.  When she checks her blood pressure at home it is typically in the normal range.  She reports having high blood pressure medical appointments ever since she was young.  When she was 14 she did a 24-hour blood pressure monitor test which was normal.  She has been resistant/hesitant about taking any medications to help with anxiety symptoms.  She reports that anxiety actually seems to be very good right now, but it still spikes at medical appointments.    She is concerned about irregular menses, some hair growth on her chin, acne and difficulty with still lose weight.  Last year her thyroid testing was normal, including the TPO antibodies.  On 11/21/2023 her hemoglobin A1c was 5.2%.              10/21/2024   General Health   How would you rate your overall physical health? Good   Feel stress (tense, anxious, or unable to sleep) Not at all            10/21/2024   Nutrition   Three or more servings of calcium each day? Yes   Diet: Low salt   How many servings of fruit and vegetables per day? 4 or more   How many sweetened beverages each day? 0-1            10/21/2024   Exercise   Days per week of moderate/strenous exercise 6 days            10/21/2024   Social Factors   Frequency of gathering with friends or relatives Three times a week   Worry food won't last until get money to buy more No   Food not last or not have enough money for food? No   Do you have housing? (Housing is defined as stable permanent housing and does not include staying ouside in a car, in a tent, in an abandoned building, in an overnight shelter, or couch-surfing.) Yes   Are you worried about losing your housing? No   Lack of transportation? No   Unable to get utilities (heat,electricity)? No            10/21/2024   Dental   Dentist two times every year? Yes            10/21/2024   TB Screening   Were you born outside of the [place]? No            Today's PHQ-2 Score:       10/21/2024     2:20 PM   PHQ-2 ( 1999  "[employer])   Q1: Little interest or pleasure in doing things 0   Q2: Feeling down, depressed or hopeless 0   PHQ-2 Score 0   Q1: Little interest or pleasure in doing things Not at all   Q2: Feeling down, depressed or hopeless Not at all   PHQ-2 Score 0           10/21/2024   Substance Use   Alcohol more than 3/day or more than 7/wk No   Do you use any other substances recreationally? No        Social History     Tobacco Use    Smoking status: Never    Smokeless tobacco: Never   Substance Use Topics    Alcohol use: Yes                  10/21/2024   STI Screening   New sexual partner(s) since last STI/HIV test? No        History of abnormal Pap smear: No - age 21-29 PAP every 3 years recommended        12/3/2020     5:39 PM   PAP / HPV   PAP-ABSTRACT See Scanned Document           This result is from an external source.           10/21/2024   Contraception/Family Planning   Questions about contraception or family planning (!) YES            Reviewed and updated as needed this visit by Provider                          Review of Systems  Constitutional, HEENT, cardiovascular, pulmonary, GI, , musculoskeletal, neuro, skin, endocrine and psych systems are negative, except as otherwise noted.     Objective    Exam  BP (!) 150/96   Pulse 110   Temp 97.3  F (36.3  C) (Temporal)   Resp 12   Ht 1.689 m (5' 6.5\")   Wt 106.3 kg (234 lb 6.4 oz)   LMP 08/21/2024 (Within Weeks)   SpO2 98%   BMI 37.27 kg/m     Estimated body mass index is 37.27 kg/m  as calculated from the following:    Height as of this encounter: 1.689 m (5' 6.5\").    Weight as of this encounter: 106.3 kg (234 lb 6.4 oz).    Physical Exam  GENERAL: alert and no distress  EYES: Eyes grossly normal to inspection, PERRL and conjunctivae and sclerae normal  HENT: ear canals and TM's normal, nose and mouth without ulcers or lesions  NECK: no adenopathy, no asymmetry, masses, or scars  RESP: lungs clear to auscultation - no rales, rhonchi or wheezes  CV: regular " rate and rhythm, normal S1 S2, no S3 or S4, no murmur, click or rub, no peripheral edema  ABDOMEN: soft, nontender, no hepatosplenomegaly, no masses and bowel sounds normal  MS: no gross musculoskeletal defects noted, no edema  SKIN: no suspicious lesions or rashes  NEURO: Normal strength and tone, mentation intact and speech normal  PSYCH: mentation appears normal, she is moderately anxious during the appointment.  She answers questions appropriately.        Signed Electronically by: Haseeb Schuster DO

## 2024-10-22 LAB
ALBUMIN SERPL BCG-MCNC: 4.5 G/DL (ref 3.5–5.2)
ALP SERPL-CCNC: 63 U/L (ref 40–150)
ALT SERPL W P-5'-P-CCNC: 73 U/L (ref 0–50)
ANION GAP SERPL CALCULATED.3IONS-SCNC: 11 MMOL/L (ref 7–15)
AST SERPL W P-5'-P-CCNC: 40 U/L (ref 0–45)
BILIRUB SERPL-MCNC: 0.3 MG/DL
BUN SERPL-MCNC: 11.1 MG/DL (ref 6–20)
CALCIUM SERPL-MCNC: 9.8 MG/DL (ref 8.8–10.4)
CHLORIDE SERPL-SCNC: 105 MMOL/L (ref 98–107)
CHOLEST SERPL-MCNC: 223 MG/DL
CREAT SERPL-MCNC: 0.58 MG/DL (ref 0.51–0.95)
EGFRCR SERPLBLD CKD-EPI 2021: >90 ML/MIN/1.73M2
FASTING STATUS PATIENT QL REPORTED: NO
FASTING STATUS PATIENT QL REPORTED: NO
GLUCOSE SERPL-MCNC: 94 MG/DL (ref 70–99)
HCO3 SERPL-SCNC: 23 MMOL/L (ref 22–29)
HDLC SERPL-MCNC: 34 MG/DL
LDLC SERPL CALC-MCNC: 144 MG/DL
NONHDLC SERPL-MCNC: 189 MG/DL
POTASSIUM SERPL-SCNC: 4.2 MMOL/L (ref 3.4–5.3)
PROT SERPL-MCNC: 7.1 G/DL (ref 6.4–8.3)
SODIUM SERPL-SCNC: 139 MMOL/L (ref 135–145)
T4 FREE SERPL-MCNC: 1.11 NG/DL (ref 0.9–1.7)
TRIGL SERPL-MCNC: 225 MG/DL
TSH SERPL DL<=0.005 MIU/L-ACNC: 4.81 UIU/ML (ref 0.3–4.2)
VIT D+METAB SERPL-MCNC: 21 NG/ML (ref 20–50)

## 2024-11-01 ENCOUNTER — TELEPHONE (OUTPATIENT)
Dept: FAMILY MEDICINE | Facility: CLINIC | Age: 29
End: 2024-11-01
Payer: COMMERCIAL

## 2024-11-01 ENCOUNTER — NURSE TRIAGE (OUTPATIENT)
Dept: NURSING | Facility: CLINIC | Age: 29
End: 2024-11-01
Payer: COMMERCIAL

## 2024-11-01 DIAGNOSIS — I10 ESSENTIAL HYPERTENSION: ICD-10-CM

## 2024-11-01 DIAGNOSIS — I10 ESSENTIAL HYPERTENSION: Primary | ICD-10-CM

## 2024-11-01 RX ORDER — LOSARTAN POTASSIUM 50 MG/1
50 TABLET ORAL DAILY
Qty: 30 TABLET | Refills: 0 | Status: SHIPPED | OUTPATIENT
Start: 2024-11-01 | End: 2024-11-03

## 2024-11-01 NOTE — TELEPHONE ENCOUNTER
Telephone order rec'd from Dr. Farzad Ramos for 30 day supply.   Pt to contact clinic for additional refills.    losartan (COZAAR) 50 MG tablet 30 tablet 0 11/1/2024 -- No   Sig - Route: Take 1 tablet (50 mg) by mouth daily. - Oral   Sent to pharmacy as: Losartan Potassium 50 MG Oral Tablet (COZAAR)   Class: E-Prescribe   Order: [id number]   E-Prescribing Status: Receipt confirmed by pharmacy (11/1/2024  6:13 PM CDT)   Renewals    Renewal requests to authorizing provider (Farzad Ramos MD) prohibited        Pharmacy    [facility] #[id number] [address] AT [facility] OF [address] Carmen RN  [facility]

## 2024-11-01 NOTE — TELEPHONE ENCOUNTER
Pt calling re: Losartan 50 mg    Rx not sent at 10/21/24 OV  Pt out of medication as of today.    30 day supply ordered by on-call provider.

## 2024-11-01 NOTE — TELEPHONE ENCOUNTER
Cortes    Losartan 50 mg    Nurse Triage SBAR    Is this a 2nd Level Triage? YES, LICENSED PRACTITIONER REVIEW IS REQUIRED    Situation: Out of Losartan; Med not ordered on recent office visit    Background:   Cortes is completely out of Losartan 50 mg.  - She had a visit with PCP on 10/21/24 and was told that med would be ordered.  - Rx was not ordered/sent to Pharmacy.  - She took her last dose of medication this morning.    Assessment: as noted above    Protocol Recommended Disposition:   Call PCP Now    Recommendation: Refill of Losartan 50 mg     Paged to provider   Provider consult indicated.     Reason for page: Refill of BP medication    Page sent to Dr. Farzad Ramos by Answering Service (Sequence [employer]), at 6:03 pm.   Provider, Dr. Ramos, contacted directly by answering service at 6:03 pm.    Provider recommended plan of care:   - Losartan 50 mg refill for 30 days, no refills  - Contact clinic for additional refills.    6:15 pm - Notified pt of Rx sent to Pharmacy  - Pt will contact clinic (has already sent Rewind Mehart msg)  losartan (COZAAR) 50 MG tablet 30 tablet 0 11/1/2024 -- No   Sig - Route: Take 1 tablet (50 mg) by mouth daily. - Oral   Sent to pharmacy as: Losartan Potassium 50 MG Oral Tablet (COZAAR)   Class: E-Prescribe   Order: [id number]   E-Prescribing Status: Receipt confirmed by pharmacy (11/1/2024  6:13 PM CDT)   Renewals    Renewal requests to authorizing provider (Farzad Ramos MD) prohibited        Pharmacy    [facility] #[id number] - [place] [address] AT [facility] [address]       Does the patient meet one of the following criteria for ADS visit consideration? 16+ years old, with an [facility] PCP     TIP  Providers, please consider if this condition is appropriate for management at one of our Acute and Diagnostic Services sites.     If patient is a good candidate, please use dotphrase <dot>triageresponse and select Refer to ADS to document.    Naz Carmen RN  M  [facility]      Reason for Disposition   [1] Prescription refill request for ESSENTIAL medicine (i.e., likelihood of harm to patient if not taken) AND [2] triager unable to refill per department policy    Protocols used: Medication Refill and Renewal Call-A-AH

## 2024-11-03 RX ORDER — LOSARTAN POTASSIUM 50 MG/1
50 TABLET ORAL DAILY
Qty: 90 TABLET | Refills: 3 | Status: SHIPPED | OUTPATIENT
Start: 2024-11-03

## 2024-11-04 DIAGNOSIS — I10 ESSENTIAL HYPERTENSION: ICD-10-CM

## 2024-11-04 RX ORDER — LOSARTAN POTASSIUM 50 MG/1
50 TABLET ORAL DAILY
Qty: 90 TABLET | Refills: 1 | OUTPATIENT
Start: 2024-11-04

## 2024-11-12 ENCOUNTER — ALLIED HEALTH/NURSE VISIT (OUTPATIENT)
Dept: FAMILY MEDICINE | Facility: CLINIC | Age: 29
End: 2024-11-12
Payer: COMMERCIAL

## 2024-11-12 DIAGNOSIS — Z23 ENCOUNTER FOR IMMUNIZATION: Primary | ICD-10-CM

## 2024-11-12 PROCEDURE — 90715 TDAP VACCINE 7 YRS/> IM: CPT

## 2024-11-12 PROCEDURE — 99207 PR NO CHARGE NURSE ONLY: CPT

## 2024-11-12 PROCEDURE — 90472 IMMUNIZATION ADMIN EACH ADD: CPT

## 2024-11-12 PROCEDURE — 90471 IMMUNIZATION ADMIN: CPT

## 2024-11-12 PROCEDURE — 90656 IIV3 VACC NO PRSV 0.5 ML IM: CPT

## 2024-11-12 NOTE — PROGRESS NOTES
Prior to immunization administration, verified patients identity using patient s name and date of birth. Please see Immunization Activity for additional information.     Is the patient's temperature normal (100.5 or less)? Yes     Patient MEETS CRITERIA. PROCEED with vaccine administration.      Patient instructed to remain in clinic for 15 minutes afterwards, and to report any adverse reactions.      Link to Ancillary Visit Immunization Standing Orders SmartSet     Screening performed by Sol Egan on 11/12/2024 at 3:08 PM.

## 2024-12-11 ENCOUNTER — OFFICE VISIT (OUTPATIENT)
Dept: OBGYN | Facility: CLINIC | Age: 29
End: 2024-12-11
Attending: FAMILY MEDICINE
Payer: COMMERCIAL

## 2024-12-11 VITALS — SYSTOLIC BLOOD PRESSURE: 180 MMHG | DIASTOLIC BLOOD PRESSURE: 110 MMHG | WEIGHT: 243 LBS | BODY MASS INDEX: 38.63 KG/M2

## 2024-12-11 DIAGNOSIS — N94.6 DYSMENORRHEA: ICD-10-CM

## 2024-12-11 DIAGNOSIS — Z13.29 SCREENING FOR THYROID DISORDER: ICD-10-CM

## 2024-12-11 DIAGNOSIS — Z12.4 CERVICAL CANCER SCREENING: ICD-10-CM

## 2024-12-11 DIAGNOSIS — N92.6 IRREGULAR MENSES: ICD-10-CM

## 2024-12-11 DIAGNOSIS — Z13.228 SCREENING FOR METABOLIC DISORDER: ICD-10-CM

## 2024-12-11 DIAGNOSIS — Z01.419 ENCOUNTER FOR BREAST AND PELVIC EXAMINATION: Primary | ICD-10-CM

## 2024-12-11 LAB
ALBUMIN SERPL BCG-MCNC: 4.5 G/DL (ref 3.5–5.2)
ALP SERPL-CCNC: 67 U/L (ref 40–150)
ALT SERPL W P-5'-P-CCNC: 55 U/L (ref 0–50)
ANION GAP SERPL CALCULATED.3IONS-SCNC: 14 MMOL/L (ref 7–15)
AST SERPL W P-5'-P-CCNC: 29 U/L (ref 0–45)
BILIRUB SERPL-MCNC: 0.3 MG/DL
BUN SERPL-MCNC: 14.7 MG/DL (ref 6–20)
CALCIUM SERPL-MCNC: 9.9 MG/DL (ref 8.8–10.4)
CHLORIDE SERPL-SCNC: 104 MMOL/L (ref 98–107)
CORTIS SERPL-MCNC: 10.2 UG/DL
CREAT SERPL-MCNC: 0.61 MG/DL (ref 0.51–0.95)
EGFRCR SERPLBLD CKD-EPI 2021: >90 ML/MIN/1.73M2
ESTRADIOL SERPL-MCNC: 56 PG/ML
GLUCOSE SERPL-MCNC: 93 MG/DL (ref 70–99)
HCO3 SERPL-SCNC: 20 MMOL/L (ref 22–29)
INSULIN SERPL-ACNC: 35.2 UU/ML (ref 2.6–24.9)
MIS SERPL-MCNC: 7.07 NG/ML (ref 0.89–9.9)
POTASSIUM SERPL-SCNC: 4.1 MMOL/L (ref 3.4–5.3)
PROGEST SERPL-MCNC: 0.3 NG/ML
PROLACTIN SERPL 3RD IS-MCNC: 12 NG/ML (ref 5–23)
PROT SERPL-MCNC: 7.4 G/DL (ref 6.4–8.3)
SHBG SERPL-SCNC: 24 NMOL/L (ref 30–135)
SODIUM SERPL-SCNC: 138 MMOL/L (ref 135–145)
T4 FREE SERPL-MCNC: 1.06 NG/DL (ref 0.9–1.7)
TSH SERPL DL<=0.005 MIU/L-ACNC: 5.88 UIU/ML (ref 0.3–4.2)

## 2024-12-11 PROCEDURE — 84270 ASSAY OF SEX HORMONE GLOBUL: CPT | Performed by: NURSE PRACTITIONER

## 2024-12-11 PROCEDURE — 80053 COMPREHEN METABOLIC PANEL: CPT | Performed by: NURSE PRACTITIONER

## 2024-12-11 PROCEDURE — 82166 ASSAY ANTI-MULLERIAN HORM: CPT | Performed by: NURSE PRACTITIONER

## 2024-12-11 PROCEDURE — 84439 ASSAY OF FREE THYROXINE: CPT | Performed by: NURSE PRACTITIONER

## 2024-12-11 PROCEDURE — 82157 ASSAY OF ANDROSTENEDIONE: CPT | Mod: 90 | Performed by: NURSE PRACTITIONER

## 2024-12-11 PROCEDURE — 99213 OFFICE O/P EST LOW 20 MIN: CPT | Mod: 25 | Performed by: NURSE PRACTITIONER

## 2024-12-11 PROCEDURE — 99000 SPECIMEN HANDLING OFFICE-LAB: CPT | Performed by: NURSE PRACTITIONER

## 2024-12-11 PROCEDURE — 82670 ASSAY OF TOTAL ESTRADIOL: CPT | Performed by: NURSE PRACTITIONER

## 2024-12-11 PROCEDURE — 99459 PELVIC EXAMINATION: CPT | Performed by: NURSE PRACTITIONER

## 2024-12-11 PROCEDURE — 86800 THYROGLOBULIN ANTIBODY: CPT | Performed by: NURSE PRACTITIONER

## 2024-12-11 PROCEDURE — 84144 ASSAY OF PROGESTERONE: CPT | Performed by: NURSE PRACTITIONER

## 2024-12-11 PROCEDURE — 84702 CHORIONIC GONADOTROPIN TEST: CPT | Performed by: NURSE PRACTITIONER

## 2024-12-11 PROCEDURE — 82533 TOTAL CORTISOL: CPT | Performed by: NURSE PRACTITIONER

## 2024-12-11 PROCEDURE — 84443 ASSAY THYROID STIM HORMONE: CPT | Performed by: NURSE PRACTITIONER

## 2024-12-11 PROCEDURE — 36415 COLL VENOUS BLD VENIPUNCTURE: CPT | Performed by: NURSE PRACTITIONER

## 2024-12-11 PROCEDURE — 99385 PREV VISIT NEW AGE 18-39: CPT | Performed by: NURSE PRACTITIONER

## 2024-12-11 PROCEDURE — 86376 MICROSOMAL ANTIBODY EACH: CPT | Performed by: NURSE PRACTITIONER

## 2024-12-11 PROCEDURE — 84146 ASSAY OF PROLACTIN: CPT | Performed by: NURSE PRACTITIONER

## 2024-12-11 PROCEDURE — 83525 ASSAY OF INSULIN: CPT | Performed by: NURSE PRACTITIONER

## 2024-12-11 PROCEDURE — 82626 DEHYDROEPIANDROSTERONE: CPT | Mod: 90 | Performed by: NURSE PRACTITIONER

## 2024-12-11 ASSESSMENT — ANXIETY QUESTIONNAIRES
5. BEING SO RESTLESS THAT IT IS HARD TO SIT STILL: NOT AT ALL
8. IF YOU CHECKED OFF ANY PROBLEMS, HOW DIFFICULT HAVE THESE MADE IT FOR YOU TO DO YOUR WORK, TAKE CARE OF THINGS AT HOME, OR GET ALONG WITH OTHER PEOPLE?: NOT DIFFICULT AT ALL
IF YOU CHECKED OFF ANY PROBLEMS ON THIS QUESTIONNAIRE, HOW DIFFICULT HAVE THESE PROBLEMS MADE IT FOR YOU TO DO YOUR WORK, TAKE CARE OF THINGS AT HOME, OR GET ALONG WITH OTHER PEOPLE: NOT DIFFICULT AT ALL
GAD7 TOTAL SCORE: 3
4. TROUBLE RELAXING: NOT AT ALL
7. FEELING AFRAID AS IF SOMETHING AWFUL MIGHT HAPPEN: NOT AT ALL
7. FEELING AFRAID AS IF SOMETHING AWFUL MIGHT HAPPEN: NOT AT ALL
1. FEELING NERVOUS, ANXIOUS, OR ON EDGE: SEVERAL DAYS
2. NOT BEING ABLE TO STOP OR CONTROL WORRYING: SEVERAL DAYS
3. WORRYING TOO MUCH ABOUT DIFFERENT THINGS: SEVERAL DAYS
6. BECOMING EASILY ANNOYED OR IRRITABLE: NOT AT ALL

## 2024-12-11 ASSESSMENT — PATIENT HEALTH QUESTIONNAIRE - PHQ9
SUM OF ALL RESPONSES TO PHQ QUESTIONS 1-9: 2
10. IF YOU CHECKED OFF ANY PROBLEMS, HOW DIFFICULT HAVE THESE PROBLEMS MADE IT FOR YOU TO DO YOUR WORK, TAKE CARE OF THINGS AT HOME, OR GET ALONG WITH OTHER PEOPLE: NOT DIFFICULT AT ALL
SUM OF ALL RESPONSES TO PHQ QUESTIONS 1-9: 2
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
## 2024-12-12 LAB
HCG INTACT+B SERPL-ACNC: <1 MIU/ML
THYROGLOB AB SERPL IA-ACNC: <20 IU/ML
THYROPEROXIDASE AB SERPL-ACNC: <10 IU/ML

## 2024-12-15 LAB
TESTOST FREE SERPL-MCNC: 0.92 NG/DL
TESTOST SERPL-MCNC: 43 NG/DL (ref 8–60)

## 2024-12-16 LAB
ANDROST SERPL-MCNC: 1.98 NG/ML
BKR LAB AP GYN ADEQUACY: NORMAL
BKR LAB AP GYN INTERPRETATION: NORMAL
BKR LAB AP HPV REFLEX: NORMAL
BKR LAB AP PREVIOUS ABNORMAL: NORMAL
DHEA SERPL-MCNC: 5.62 NG/ML
PATH REPORT.COMMENTS IMP SPEC: NORMAL
PATH REPORT.COMMENTS IMP SPEC: NORMAL
PATH REPORT.RELEVANT HX SPEC: NORMAL

## 2024-12-26 DIAGNOSIS — N92.6 IRREGULAR MENSES: ICD-10-CM

## 2024-12-26 NOTE — TELEPHONE ENCOUNTER
Requested Prescriptions   Pending Prescriptions Disp Refills    medroxyPROGESTERone (PROVERA) 10 MG tablet [Pharmacy Med Name: MEDROXYPROGESTERONE 10MG TABLETS] 10 tablet 0     Sig: TAKE 1 TABLET(10 MG) BY MOUTH DAILY FOR 10 DAYS       There is no refill protocol information for this order        Last Written Prescription Date:  12/19/24  Last Fill Quantity: 10,  # refills: 0   Last office visit: 12/11/2024 ; last virtual visit: Visit date not found with prescribing provider:  Yazmin Alegria   Future Office Visit:      Routing to provider to advise    Olga Ordoñez RN on 12/26/2024 at 10:38 AM  [facility] Triage

## 2024-12-30 RX ORDER — MEDROXYPROGESTERONE ACETATE 10 MG
TABLET ORAL
Qty: 10 TABLET | Refills: 0 | OUTPATIENT
Start: 2024-12-30

## 2025-01-11 DIAGNOSIS — N92.6 IRREGULAR MENSES: ICD-10-CM

## 2025-01-13 NOTE — TELEPHONE ENCOUNTER
Requested Prescriptions   Pending Prescriptions Disp Refills    medroxyPROGESTERone (PROVERA) 10 MG tablet [Pharmacy Med Name: MEDROXYPROGESTERONE 10MG TABLETS] 10 tablet 0     Sig: TAKE 1 TABLET(10 MG) BY MOUTH DAILY FOR 10 DAYS       There is no refill protocol information for this order      NOT ON ACTIVE MED LIST  Last Written Prescription Date:  12/19/2024  Last Fill Quantity: 10,  # refills: 0   Last office visit: 12/11/2024 ; last virtual visit: Visit date not found with prescribing provider:  Yazmin Alegria NP   Future Office Visit:  NONE    Routing refill request to SYDNI Alegria NP to advise. Looks like one time med Rx    Yazmin Dillard RN on 1/13/2025 at 3:40 PM

## 2025-01-20 RX ORDER — MEDROXYPROGESTERONE ACETATE 10 MG
TABLET ORAL
Qty: 10 TABLET | Refills: 0 | Status: SHIPPED | OUTPATIENT
Start: 2025-01-20

## 2025-06-05 ENCOUNTER — OFFICE VISIT (OUTPATIENT)
Dept: URGENT CARE | Facility: URGENT CARE | Age: 30
End: 2025-06-05
Payer: COMMERCIAL

## 2025-06-05 VITALS
SYSTOLIC BLOOD PRESSURE: 167 MMHG | TEMPERATURE: 98.4 F | OXYGEN SATURATION: 98 % | WEIGHT: 245 LBS | BODY MASS INDEX: 38.95 KG/M2 | DIASTOLIC BLOOD PRESSURE: 86 MMHG | RESPIRATION RATE: 17 BRPM | HEART RATE: 94 BPM

## 2025-06-05 DIAGNOSIS — R10.12 ABDOMINAL PAIN, LEFT UPPER QUADRANT: Primary | ICD-10-CM

## 2025-06-05 NOTE — PATIENT INSTRUCTIONS
If symptoms worsen with you have fever, bloody stools, increasing abdominal pain please seek immediate medical attention      Schedule follow-up appointment to meet with your doctor in 1 to 2 weeks in case your symptoms do not improve      Our team will reach out to you if your blood work is abnormal

## 2025-06-05 NOTE — PROGRESS NOTES
Urgent Care Clinic Visit    Chief Complaint   Patient presents with    Urgent Care     Pt presents intermittent pain underneath ribs that radiates down to belly button, onset for a while but worse past 2-3 days.               6/5/2025     4:55 PM   Additional Questions   Roomed by Vivi Goss   Accompanied by [email or web address]

## 2025-06-05 NOTE — PROGRESS NOTES
Assessment & Plan     Abdominal pain, left upper quadrant  - CBC with platelets and differential  - Comprehensive metabolic panel (BMP + Alb, Alk Phos, ALT, AST, Total. Bili, TP)  - Lipase     No red flag symptoms identified at this time suspicion for surgical abdomen is low.  No respiratory symptoms present.  Quite possibly experiencing a musculoskeletal injury from attempting new workout regimens and appears to be triggered by particular positions and movement.  Patient will be cautious over the next couple weeks with performing any abdominal wall intensive exercises but she should continue with her regular activities of daily living    Patient had been taken to the lab but it was determined later that she opted to have her labs drawn on a different day    Mitchell Abernathy MD   [facility]    Aranza Kolb is a 29 year old female who presents to clinic today for the following health issues:  Chief Complaint   Patient presents with    Urgent Care     Pt presents intermittent pain underneath ribs that radiates down to belly button, onset for a while but worse past 2-3 days.      HPI    Patient comes today with concerns of intermittent left lower abdominal pain she reports this could have occurred after trying some new workout activities with a friend possibly certain positions and leaning forward may aggravate her symptoms she reports no injuries directly to her ribs.  No fatigue or enlarged tonsils or lymphadenopathy of the neck.  No exposures to mononucleosis to her knowledge.    She admits that she is an anxious person    No bloody stools, abnormal weight loss or night sweats    She reports that she has hypothyroidism but has not been on therapy in a period of time.    She had mentioned a different type of mild abdominal pain many months ago at her physical exam but there were no red flag symptoms identified the time and she was advised to monitor her symptoms she did not have any continuation  of those particular symptoms over the last several months.    Denies any recent cough or respiratory infection  No history abdominal surgeries      Patient has a history of polycystic ovarian syndrome but has not started her metformin therapy since diagnosis    There are no active problems to display for this patient.      Current Outpatient Medications   Medication Sig Dispense Refill    losartan (COZAAR) 50 MG tablet Take 1 tablet (50 mg) by mouth daily. 90 tablet 3    losartan (COZAAR) 50 MG tablet Take 1 tablet (50 mg) by mouth daily 90 tablet 1    propranolol (INDERAL) 10 MG tablet Take 1-2 tablets (10-20 mg) by mouth 3 times daily as needed (Anxiety) 60 tablet 1    tiZANidine (ZANAFLEX) 4 MG tablet Take 1 tablet (4 mg) by mouth 3 times daily. 30 tablet 0    levothyroxine (SYNTHROID/LEVOTHROID) 25 MCG tablet Take 1 tablet (25 mcg) by mouth every morning (before breakfast). (Patient not taking: Reported on 6/5/2025) 90 tablet 3    medroxyPROGESTERone (PROVERA) 10 MG tablet TAKE 1 TABLET(10 MG) BY MOUTH DAILY FOR 10 DAYS (Patient not taking: Reported on 6/5/2025) 10 tablet 0    metFORMIN (GLUCOPHAGE XR) 500 MG 24 hr tablet Take 1 tablet (500 mg) by mouth daily (with dinner). (Patient not taking: Reported on 6/5/2025) 90 tablet 3     No current facility-administered medications for this visit.           Objective    BP (!) 167/86   Pulse 94   Temp 98.4  F (36.9  C) (Tympanic)   Resp 17   Wt 111.1 kg (245 lb)   LMP 01/01/2025   SpO2 98%   BMI 38.95 kg/m    Physical Exam   As noted above and including:   GEN: good historian  Abd: Exam limited due to body habitus.  No guarding with deep palpation.  Carnett's sign negative.  Tapia sign negative  CV: HDS  Pulm: non-labored    No results found for any visits on 06/05/25.                  The use of [employer] services may have been used to construct the content in this note; any grammatical or spelling errors are non-intentional. Please contact  the author of this note directly if you are in need of any clarification.

## 2025-06-12 ENCOUNTER — RESULTS FOLLOW-UP (OUTPATIENT)
Dept: FAMILY MEDICINE | Facility: CLINIC | Age: 30
End: 2025-06-12

## 2025-06-12 ENCOUNTER — OFFICE VISIT (OUTPATIENT)
Dept: FAMILY MEDICINE | Facility: CLINIC | Age: 30
End: 2025-06-12
Payer: COMMERCIAL

## 2025-06-12 VITALS
TEMPERATURE: 98.4 F | HEIGHT: 67 IN | OXYGEN SATURATION: 97 % | HEART RATE: 77 BPM | DIASTOLIC BLOOD PRESSURE: 85 MMHG | SYSTOLIC BLOOD PRESSURE: 120 MMHG | BODY MASS INDEX: 38.14 KG/M2 | WEIGHT: 243 LBS | RESPIRATION RATE: 16 BRPM

## 2025-06-12 DIAGNOSIS — F41.9 ANXIETY: ICD-10-CM

## 2025-06-12 DIAGNOSIS — E66.01 CLASS 2 SEVERE OBESITY DUE TO EXCESS CALORIES WITH SERIOUS COMORBIDITY AND BODY MASS INDEX (BMI) OF 38.0 TO 38.9 IN ADULT (H): ICD-10-CM

## 2025-06-12 DIAGNOSIS — R74.8 ELEVATED LIVER ENZYMES: ICD-10-CM

## 2025-06-12 DIAGNOSIS — I10 ESSENTIAL HYPERTENSION: ICD-10-CM

## 2025-06-12 DIAGNOSIS — E16.1 HYPERINSULINEMIA: ICD-10-CM

## 2025-06-12 DIAGNOSIS — Z13.1 SCREENING FOR DIABETES MELLITUS: ICD-10-CM

## 2025-06-12 DIAGNOSIS — R10.9 LEFT SIDED ABDOMINAL PAIN: ICD-10-CM

## 2025-06-12 DIAGNOSIS — Z13.1 SCREENING FOR DIABETES MELLITUS: Primary | ICD-10-CM

## 2025-06-12 DIAGNOSIS — E03.8 SUBCLINICAL HYPOTHYROIDISM: ICD-10-CM

## 2025-06-12 DIAGNOSIS — R10.13 ABDOMINAL PAIN, EPIGASTRIC: Primary | ICD-10-CM

## 2025-06-12 DIAGNOSIS — E66.812 CLASS 2 SEVERE OBESITY DUE TO EXCESS CALORIES WITH SERIOUS COMORBIDITY AND BODY MASS INDEX (BMI) OF 38.0 TO 38.9 IN ADULT (H): ICD-10-CM

## 2025-06-12 LAB
ALBUMIN SERPL BCG-MCNC: 4.5 G/DL (ref 3.5–5.2)
ALBUMIN UR-MCNC: NEGATIVE MG/DL
ALP SERPL-CCNC: 75 U/L (ref 40–150)
ALT SERPL W P-5'-P-CCNC: 61 U/L (ref 0–50)
ANION GAP SERPL CALCULATED.3IONS-SCNC: 11 MMOL/L (ref 7–15)
APPEARANCE UR: CLEAR
AST SERPL W P-5'-P-CCNC: 34 U/L (ref 0–45)
BILIRUB SERPL-MCNC: 0.3 MG/DL
BILIRUB UR QL STRIP: NEGATIVE
BILIRUBIN DIRECT (ROCHE PRO & PURE): 0.11 MG/DL (ref 0–0.45)
BUN SERPL-MCNC: 7.5 MG/DL (ref 6–20)
CALCIUM SERPL-MCNC: 9.7 MG/DL (ref 8.8–10.4)
CHLORIDE SERPL-SCNC: 106 MMOL/L (ref 98–107)
COLOR UR AUTO: YELLOW
CREAT SERPL-MCNC: 0.61 MG/DL (ref 0.51–0.95)
EGFRCR SERPLBLD CKD-EPI 2021: >90 ML/MIN/1.73M2
ERYTHROCYTE [DISTWIDTH] IN BLOOD BY AUTOMATED COUNT: 11.9 % (ref 10–15)
EST. AVERAGE GLUCOSE BLD GHB EST-MCNC: 117 MG/DL
GLUCOSE SERPL-MCNC: 120 MG/DL (ref 70–99)
GLUCOSE UR STRIP-MCNC: NEGATIVE MG/DL
HBA1C MFR BLD: 5.7 % (ref 0–5.6)
HCO3 SERPL-SCNC: 23 MMOL/L (ref 22–29)
HCT VFR BLD AUTO: 40.8 % (ref 35–47)
HGB BLD-MCNC: 13.5 G/DL (ref 11.7–15.7)
HGB UR QL STRIP: NEGATIVE
INSULIN SERPL-ACNC: 46.4 UU/ML (ref 2.6–24.9)
KETONES UR STRIP-MCNC: NEGATIVE MG/DL
LEUKOCYTE ESTERASE UR QL STRIP: ABNORMAL
LIPASE SERPL-CCNC: 21 U/L (ref 13–60)
MCH RBC QN AUTO: 27.6 PG (ref 26.5–33)
MCHC RBC AUTO-ENTMCNC: 33.1 G/DL (ref 31.5–36.5)
MCV RBC AUTO: 83 FL (ref 78–100)
NITRATE UR QL: NEGATIVE
PH UR STRIP: 6.5 [PH] (ref 5–7)
PLATELET # BLD AUTO: 242 10E3/UL (ref 150–450)
POTASSIUM SERPL-SCNC: 4.4 MMOL/L (ref 3.4–5.3)
PROT SERPL-MCNC: 7.2 G/DL (ref 6.4–8.3)
RBC # BLD AUTO: 4.89 10E6/UL (ref 3.8–5.2)
RBC #/AREA URNS AUTO: NORMAL /HPF
SODIUM SERPL-SCNC: 140 MMOL/L (ref 135–145)
SP GR UR STRIP: 1.01 (ref 1–1.03)
T4 FREE SERPL-MCNC: 1.2 NG/DL (ref 0.9–1.7)
TSH SERPL DL<=0.005 MIU/L-ACNC: 6.1 UIU/ML (ref 0.3–4.2)
UROBILINOGEN UR STRIP-ACNC: 0.2 E.U./DL
WBC # BLD AUTO: 6.4 10E3/UL (ref 4–11)
WBC #/AREA URNS AUTO: NORMAL /HPF

## 2025-06-12 RX ORDER — PROPRANOLOL HYDROCHLORIDE 10 MG/1
10-20 TABLET ORAL 3 TIMES DAILY PRN
Qty: 60 TABLET | Refills: 1 | Status: SHIPPED | OUTPATIENT
Start: 2025-06-12

## 2025-06-12 ASSESSMENT — PAIN SCALES - PAIN ENJOYMENT GENERAL ACTIVITY SCALE (PEG)
PEG_TOTALSCORE: 3
PEG_TOTALSCORE: 3
AVG_PAIN_PASTWEEK: 4
INTERFERED_GENERAL_ACTIVITY: 3
INTERFERED_ENJOYMENT_LIFE: 2
INTERFERED_GENERAL_ACTIVITY: 3
INTERFERED_ENJOYMENT_LIFE: 2
AVG_PAIN_PASTWEEK: 4

## 2025-06-12 ASSESSMENT — ANXIETY QUESTIONNAIRES
3. WORRYING TOO MUCH ABOUT DIFFERENT THINGS: SEVERAL DAYS
4. TROUBLE RELAXING: NOT AT ALL
5. BEING SO RESTLESS THAT IT IS HARD TO SIT STILL: NOT AT ALL
GAD7 TOTAL SCORE: 4
GAD7 TOTAL SCORE: 4
7. FEELING AFRAID AS IF SOMETHING AWFUL MIGHT HAPPEN: SEVERAL DAYS
7. FEELING AFRAID AS IF SOMETHING AWFUL MIGHT HAPPEN: SEVERAL DAYS
6. BECOMING EASILY ANNOYED OR IRRITABLE: NOT AT ALL
1. FEELING NERVOUS, ANXIOUS, OR ON EDGE: SEVERAL DAYS
2. NOT BEING ABLE TO STOP OR CONTROL WORRYING: SEVERAL DAYS
8. IF YOU CHECKED OFF ANY PROBLEMS, HOW DIFFICULT HAVE THESE MADE IT FOR YOU TO DO YOUR WORK, TAKE CARE OF THINGS AT HOME, OR GET ALONG WITH OTHER PEOPLE?: NOT DIFFICULT AT ALL
GAD7 TOTAL SCORE: 4
IF YOU CHECKED OFF ANY PROBLEMS ON THIS QUESTIONNAIRE, HOW DIFFICULT HAVE THESE PROBLEMS MADE IT FOR YOU TO DO YOUR WORK, TAKE CARE OF THINGS AT HOME, OR GET ALONG WITH OTHER PEOPLE: NOT DIFFICULT AT ALL

## 2025-06-12 ASSESSMENT — PATIENT HEALTH QUESTIONNAIRE - PHQ9
SUM OF ALL RESPONSES TO PHQ QUESTIONS 1-9: 2
SUM OF ALL RESPONSES TO PHQ QUESTIONS 1-9: 2
10. IF YOU CHECKED OFF ANY PROBLEMS, HOW DIFFICULT HAVE THESE PROBLEMS MADE IT FOR YOU TO DO YOUR WORK, TAKE CARE OF THINGS AT HOME, OR GET ALONG WITH OTHER PEOPLE: NOT DIFFICULT AT ALL

## 2025-06-12 ASSESSMENT — PAIN SCALES - GENERAL: PAINLEVEL_OUTOF10: MILD PAIN (2)

## 2025-06-12 NOTE — PROGRESS NOTES
Assessment & Plan     Abdominal pain, epigastric  I recommended that we check abdominal x-rays to further evaluate the upper and left-sided abdominal pain symptoms she has been experiencing.  I personally reviewed the images with her and provided an initial interpretation.  There is quite a bit of stool present in the right colon and a lot of gas on the left.  No dilated loops of bowel.  No free air.  The formal radiology report is pending.  The underlying cause of her symptoms is still not clear, but gas pains may be a significant contributor.  We discussed strategies to help with this.  I recommended she try eating a low FODMAPs diet, get adequate fiber and water intake.  I also recommended that we check labs as listed below.  We are including the labs from her OB/GYN provider that includes a hepatic panel, insulin and thyroid labs.  She will be notified of the results when they are available and we can further develop the treatment plan accordingly.  - XR Abdomen 2 Views; Future  - CBC with platelets; Future  - Lipase; Future  - UA with Microscopic reflex to Culture - lab collect; Future  - Basic metabolic panel  (Ca, Cl, CO2, Creat, Gluc, K, Na, BUN); Future    Left sided abdominal pain  - XR Abdomen 2 Views; Future    Anxiety  Anxiety symptoms continue to run high, especially medical situations.  She requested a refill of propranolol which she is planning to start.    - propranolol (INDERAL) 10 MG tablet; Take 1-2 tablets (10-20 mg) by mouth 3 times daily as needed (Anxiety).    Essential hypertension  Blood pressure is under adequate control today on losartan.    Class 2 severe obesity due to excess calories with serious comorbidity and body mass index (BMI) of 38.0 to 38.9 in adult (H)  She continues to struggle with weight gain despite getting regular exercise and trying to eat a healthy diet.  She is following with her OB/GYN provider and was prescribed levothyroxine and metformin which she has not  "started yet.  She is planning to start these in the future.    Subclinical hypothyroidism  She has a prescription for levothyroxine, but she has not started it yet.  We will be rechecking thyroid cascade as part of today's labs.    The longitudinal plan of care for the diagnosis(es)/condition(s) as documented were addressed during this visit. Due to the added complexity in care, I will continue to support Cortes in the subsequent management and with ongoing continuity of care.      42 minutes spent by me on the date of the encounter doing chart review, review of test results, interpretation of tests, patient visit, and documentation       BMI  Estimated body mass index is 38.06 kg/m  as calculated from the following:    Height as of this encounter: 1.702 m (5' 7\").    Weight as of this encounter: 110.2 kg (243 lb).   Weight management plan: Discussed healthy diet and exercise guidelines          Aranza Kolb is a 29 year old, presenting for the following health issues:  Abdominal Pain (Intermint for 6 months)      6/12/2025     7:54 AM   Additional Questions   Roomed by jenna SALVADOR              Cortes is a 29-year-old female with a medical history significant for anxiety, hypertension and obesity who presents to the clinic for evaluation of abdominal pain symptoms she has been experiencing off-and-on over the past several months.  She describes noticing remittent episodes of dull achy abdominal pain in the upper abdomen and left abdomen that would occur a couple of times a week over the past several months.  Over the past few weeks symptoms have been much more constant and occurring multiple times daily.  She reports starting a new exercise workout which she thinks may have contributed to the worsening symptoms.  She describes feeling bloated and gassy at times.  Her stools are occasionally mucousy.  They are not black or bloody.  She denies any significant constipation or diarrhea symptoms.  Occasionally " "she feels nauseous after eating, but she has not been vomiting.  She is not having fevers and has not noticed heartburn symptoms.  The abdominal discomfort can worsen with sitting.  She reports cutting out gluten and dairy from her diet which seems to have calm the symptoms down a little.     She was seen in the urgent care on 6/5/2025 for the symptoms.  Exam findings were not suggestive of an acute abdomen.  They recommended checking labs.  She reports feeling extremely anxious at the time and decided not to do the labs that day.  She is open to doing labs today though.    She takes losartan 50 mg daily for blood pressure control.  She has a prescription for propranolol to be used as needed for anxiety, but she has not started it yet.    He is being followed by OB/GYN for dysmenorrhea and irregular menses.  They checked several labs which showed that her TSH was elevated at 5.88 (free T4 normal at 1.06), insulin level was elevated at 35.2 and her ALT was elevated at 55.  Her previous TSH was also mildly elevated.  Her provider recommended starting Synthroid 25 mcg daily and metformin  mg daily.  She has not started these medications yet, due to feeling anxious about starting new medications, but she is planning to start them.      Review of Systems  Constitutional, HEENT, cardiovascular, pulmonary, GI, , musculoskeletal, neuro, skin, endocrine and psych systems are negative, except as otherwise noted.      Objective    /85   Pulse 77   Temp 98.4  F (36.9  C) (Temporal)   Resp 16   Ht 1.702 m (5' 7\")   Wt 110.2 kg (243 lb)   LMP 01/01/2025   SpO2 97%   BMI 38.06 kg/m    Body mass index is 38.06 kg/m .  Physical Exam   GENERAL: alert and no distress  EYES: Eyes grossly normal to inspection, PERRL and conjunctivae and sclerae normal  HENT: nose and mouth without ulcers or lesions  NECK: no adenopathy, no asymmetry, masses, or scars  RESP: lungs clear to auscultation - no rales, rhonchi or " wheezes  CV: regular rate and rhythm, normal S1 S2, no S3 or S4, no murmur, click or rub, no peripheral edema  ABDOMEN: soft, trace tenderness in the left upper and middle abdomen without rebounding, guarding or rigidity.  No hepatosplenomegaly, no masses and bowel sounds normal  MS: no gross musculoskeletal defects noted, no edema  SKIN: no suspicious lesions or rashes  NEURO: Normal strength and tone, mentation intact and speech normal  PSYCH: mentation appears normal, appears anxious during the office visit.  She answers questions appropriately.            Signed Electronically by: Haseeb Schuster DO

## 2025-08-02 ENCOUNTER — MYC MEDICAL ADVICE (OUTPATIENT)
Dept: FAMILY MEDICINE | Facility: CLINIC | Age: 30
End: 2025-08-02
Payer: COMMERCIAL

## 2025-08-02 DIAGNOSIS — I10 ESSENTIAL HYPERTENSION: ICD-10-CM

## 2025-08-04 RX ORDER — LOSARTAN POTASSIUM 50 MG/1
50 TABLET ORAL DAILY
Qty: 30 TABLET | Refills: 5 | Status: SHIPPED | OUTPATIENT
Start: 2025-08-04